# Patient Record
Sex: MALE | Race: WHITE | NOT HISPANIC OR LATINO
[De-identification: names, ages, dates, MRNs, and addresses within clinical notes are randomized per-mention and may not be internally consistent; named-entity substitution may affect disease eponyms.]

---
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
## 2024-11-27 NOTE — PROGRESS NOTES
ASSESSMENT & PLAN    Esteban was seen today for hand injury.    Diagnoses and all orders for this visit:    Left hand pain  -     XR Hand Left G/E 3 Views; Future  -     Orthopedic  Referral; Future    Palmar nodule, left  -     Orthopedic  Referral; Future      This issue is chronic and Unchanged.    # Left palm pain/ nodule: Esteban Solano  was seen today for left pain/ nodule that has been unchanged since a crush injury at work 2 years ago. On examination there are positive findings of tenderness around the nodule. Imaging findings showed unremarkable xrays. Likely cause of patient's condition due to scar/ keloid from crush injury. Otherwise functional distal flexor tendons.  Counseled patient on nature of condition and treatment options.    - Referral: hand surgery referral   - Activity: activity as tolerated  - Ice, heat, massage as needed   - Medications:      - ibuprofen 600mg or diclofenac (voltaren) gel three times daily for 1-2 weeks.      - Okay to take tylenol 1000mg three times daily as needed in addition to these.    Follow-up: with hand surgery    Harpreet George MD  [facility]    -----  Chief Complaint   Patient presents with    Left Hand - Hand Injury       SUBJECTIVE  Esteban Solano is a/an 24 year old male who is seen as a self referral for evaluation of left hand.     The patient is seen by themselves.  The patient is Right handed    Onset: ~1 years(s) ago. Patient describes injury as left hand pulled into a hay sheet roller and crushed at work () on 5/31/22. He reports that he was seen in the ED at that time and x-rays were negative for fracture. He reports that he did not require sutures in the hand.   Over the last year, the patient has noticed localized swelling and pain in the center of his palm with no new injury. He reports that this has become more painful over the last 3 months.  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  CV: distal perfusion intact   Resp: normal respiratory effort without conversational dyspnea   Psych: normal mood and affect  Gait: NORMAL  Neuro: Normal light sensory exam of left upper extremity     LEFT HAND  Inspection/ Palpation    2cm asymmetric nodule with folding, mild purple/ bluish coloration with some TTP over the area and just distal to it. Other bony and soft tissue landmarks nontender  Range of Motion:    Full active flexion and extension at MCP, PIP, and DIP joints; normal finger cascade without malrotation.  Wrist pronation, supination, and ulnar/radial deviation normal.  Strength:     full, extension full, flexion full  Special Tests:    Positive: none    Negative: thenar eminence wasting, hypothenar eminence wasting, flexor digitorum superficialis testing, flexor digitorum profundus testing, no palpable triggering       RADIOLOGY:  Final results and radiologist's interpretation, available in the [facility] health record.  Images were reviewed with the patient in the office today.  My personal interpretation of the performed imaging:   - unremarkable hand xrays today, unchanged from 6/6/22.     Reviewed notes from 6/9 and 6/30/22 related to initial crush injury     "

## 2024-12-02 ENCOUNTER — ANCILLARY PROCEDURE (OUTPATIENT)
Dept: GENERAL RADIOLOGY | Facility: CLINIC | Age: 24
End: 2024-12-02
Attending: STUDENT IN AN ORGANIZED HEALTH CARE EDUCATION/TRAINING PROGRAM

## 2024-12-02 ENCOUNTER — OFFICE VISIT (OUTPATIENT)
Dept: ORTHOPEDICS | Facility: CLINIC | Age: 24
End: 2024-12-02
Payer: OTHER MISCELLANEOUS

## 2024-12-02 VITALS — HEIGHT: 71 IN | BODY MASS INDEX: 28.56 KG/M2 | WEIGHT: 204 LBS

## 2024-12-02 DIAGNOSIS — R22.32 PALMAR NODULE, LEFT: ICD-10-CM

## 2024-12-02 DIAGNOSIS — M79.642 LEFT HAND PAIN: Primary | ICD-10-CM

## 2024-12-02 DIAGNOSIS — M79.642 LEFT HAND PAIN: ICD-10-CM

## 2024-12-02 PROCEDURE — 73130 X-RAY EXAM OF HAND: CPT | Mod: TC | Performed by: RADIOLOGY

## 2024-12-02 PROCEDURE — 99204 OFFICE O/P NEW MOD 45 MIN: CPT | Performed by: STUDENT IN AN ORGANIZED HEALTH CARE EDUCATION/TRAINING PROGRAM

## 2024-12-02 NOTE — PATIENT INSTRUCTIONS
[facility]   Scheduling # is [phone]   - Hand Surgery:   - Dr. LIV Vallejo ([facility])  - Dr. Nikhil Vega ([facility])  - Dr. Rolando Love ([facility])  - Dr. Naty Godwin ([facility])  - Dr. Gonzalez Damon ([facility])  - Dr. Aparna Light ([facility])   - Dr. Dulce Parker ([facility])      [facility]  If you would like to go to [facility], please check with their staff that your insurance is accepted there prior to your visit!  Scheduling # is [phone]  - Hand Surgery: Jhonny Zuluaga MD

## 2024-12-02 NOTE — LETTER
12/2/2024      Esteban Solano  [address]  [place] [address]      Dear Colleague,    Thank you for referring your patient, Esteban Solano, to the [facility]. Please see a copy of my visit note below.    ASSESSMENT & PLAN    Esteban was seen today for hand injury.    Diagnoses and all orders for this visit:    Left hand pain  -     XR Hand Left G/E 3 Views; Future  -     Orthopedic  Referral; Future    Palmar nodule, left  -     Orthopedic  Referral; Future      This issue is chronic and Unchanged.    # Left palm pain/ nodule: Esteban Solano  was seen today for left pain/ nodule that has been unchanged since a crush injury at work 2 years ago. On examination there are positive findings of tenderness around the nodule. Imaging findings showed unremarkable xrays. Likely cause of patient's condition due to scar/ keloid from crush injury. Otherwise functional distal flexor tendons.  Counseled patient on nature of condition and treatment options.    - Referral: hand surgery referral   - Activity: activity as tolerated  - Ice, heat, massage as needed   - Medications:      - ibuprofen 600mg or diclofenac (voltaren) gel three times daily for 1-2 weeks.      - Okay to take tylenol 1000mg three times daily as needed in addition to these.    Follow-up: with hand surgery    Harpreet George MD  [facility]    -----  Chief Complaint   Patient presents with     Left Hand - Hand Injury       SUBJECTIVE  Esteban Solano is a/an 24 year old male who is seen as a self referral for evaluation of left hand.     The patient is seen by themselves.  The patient is Right handed    Onset: ~1 years(s) ago. Patient describes injury as left hand pulled into a hay sheet roller and crushed at work () on 5/31/22. He reports that he was seen in the ED at that time and x-rays were negative for fracture. He reports that he did not require  "sutures in the hand.   Over the last year, the patient has noticed localized swelling and pain in the center of his palm with no new injury. He reports that this has become more painful over the last 3 months.  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  CV: distal perfusion intact   Resp: normal respiratory effort without conversational dyspnea   Psych: normal mood and affect  Gait: NORMAL  Neuro: Normal light sensory exam of left upper extremity     LEFT HAND  Inspection/ Palpation    2cm asymmetric nodule with folding, mild purple/ bluish coloration with some TTP over the area and just distal to it. Other bony and soft tissue landmarks nontender  Range of Motion:    Full active flexion and extension at MCP, PIP, and DIP joints; normal finger cascade without malrotation.  Wrist pronation, supination, and ulnar/radial deviation normal.  Strength:     full, extension full, flexion full  Special Tests:    Positive: none    Negative: thenar eminence wasting, hypothenar eminence wasting, flexor digitorum superficialis testing, flexor digitorum profundus testing, no palpable triggering       RADIOLOGY:  Final results and radiologist's interpretation, available in the [facility] health record.  Images were reviewed with the patient in the office today.  My personal interpretation of the performed imaging:   - unremarkable hand xrays today, unchanged from 6/6/22.     Reviewed notes from 6/9 and 6/30/22 related to initial crush " injury         Again, thank you for allowing me to participate in the care of your patient.        Sincerely,        Harpreet George MD

## 2024-12-03 ENCOUNTER — PATIENT OUTREACH (OUTPATIENT)
Dept: CARE COORDINATION | Facility: CLINIC | Age: 24
End: 2024-12-03

## 2024-12-05 ENCOUNTER — PATIENT OUTREACH (OUTPATIENT)
Dept: CARE COORDINATION | Facility: CLINIC | Age: 24
End: 2024-12-05

## 2025-01-05 ENCOUNTER — HEALTH MAINTENANCE LETTER (OUTPATIENT)
Age: 25
End: 2025-01-05